# Patient Record
Sex: MALE | URBAN - METROPOLITAN AREA
[De-identification: names, ages, dates, MRNs, and addresses within clinical notes are randomized per-mention and may not be internally consistent; named-entity substitution may affect disease eponyms.]

---

## 2018-04-24 ENCOUNTER — IMPORTED ENCOUNTER (OUTPATIENT)
Dept: URBAN - METROPOLITAN AREA CLINIC 50 | Facility: CLINIC | Age: 73
End: 2018-04-24

## 2018-04-24 NOTE — PATIENT DISCUSSION
"""Start Blinking sequences both eyes . as many times a day as you can. (3-5 Reps) Especially while reading. "" ""Start Refresh both eyes twice a day. or as needed.  """

## 2018-05-08 ENCOUNTER — IMPORTED ENCOUNTER (OUTPATIENT)
Dept: URBAN - METROPOLITAN AREA CLINIC 50 | Facility: CLINIC | Age: 73
End: 2018-05-08

## 2018-05-15 ENCOUNTER — IMPORTED ENCOUNTER (OUTPATIENT)
Dept: URBAN - METROPOLITAN AREA CLINIC 50 | Facility: CLINIC | Age: 73
End: 2018-05-15

## 2018-07-11 ENCOUNTER — IMPORTED ENCOUNTER (OUTPATIENT)
Dept: URBAN - METROPOLITAN AREA CLINIC 50 | Facility: CLINIC | Age: 73
End: 2018-07-11

## 2018-07-11 NOTE — PATIENT DISCUSSION
"""Recommend Bilateral Upper Lid Blepharoplasty with excision of Lesion(s) right upper lid and left lower lid 07/19/2018 at [address] [place] [address].  Discussed with patient KRISTY

## 2018-07-23 ENCOUNTER — IMPORTED ENCOUNTER (OUTPATIENT)
Dept: URBAN - METROPOLITAN AREA CLINIC 50 | Facility: CLINIC | Age: 73
End: 2018-07-23

## 2018-07-30 ENCOUNTER — IMPORTED ENCOUNTER (OUTPATIENT)
Dept: URBAN - METROPOLITAN AREA CLINIC 50 | Facility: CLINIC | Age: 73
End: 2018-07-30

## 2021-04-18 ASSESSMENT — VISUAL ACUITY
OD_OTHER: 20/25. 20/25.
OS_BAT: 20/30
OD_SC: 20/20
OD_SC: 20/20
OS_SC: 20/25
OS_SC: 20/25+2
OS_OTHER: 20/30. 20/40.
OS_SC: 20/20
OD_SC: 20/20
OS_SC: 20/20-
OS_CC: J1+
OD_BAT: 20/25
OD_SC: 20/20
OD_CC: J1+

## 2021-04-18 ASSESSMENT — TONOMETRY
OD_IOP_MMHG: 15
OD_IOP_MMHG: 16
OS_IOP_MMHG: 16
OS_IOP_MMHG: 16
OD_IOP_MMHG: 16
OS_IOP_MMHG: 16

## 2021-05-15 ENCOUNTER — PREPPED CHART (OUTPATIENT)
Dept: URBAN - METROPOLITAN AREA CLINIC 49 | Facility: CLINIC | Age: 76
End: 2021-05-15

## 2021-05-15 ASSESSMENT — VISUAL ACUITY
OS_SC: 20/25
OD_SC: 20/20

## 2021-05-17 ENCOUNTER — COMPREHENSIVE EXAM (OUTPATIENT)
Dept: URBAN - METROPOLITAN AREA CLINIC 50 | Facility: CLINIC | Age: 76
End: 2021-05-17

## 2021-05-17 DIAGNOSIS — H16.223: ICD-10-CM

## 2021-05-17 PROCEDURE — 92014 COMPRE OPH EXAM EST PT 1/>: CPT

## 2021-05-17 ASSESSMENT — VISUAL ACUITY
OD_SC: 20/20-2
OS_SC: 20/20-1

## 2021-05-17 ASSESSMENT — TONOMETRY
OD_IOP_MMHG: 15
OS_IOP_MMHG: 15

## 2021-05-17 NOTE — PATIENT DISCUSSION
Discussion on dry eye. Explained that dry eye can cause blurred/fluctuating VA. Consider Restasis if dry eye symptoms worsen.

## 2022-09-21 ENCOUNTER — COMPREHENSIVE EXAM (OUTPATIENT)
Dept: URBAN - METROPOLITAN AREA CLINIC 53 | Facility: CLINIC | Age: 77
End: 2022-09-21

## 2022-09-21 DIAGNOSIS — H16.223: ICD-10-CM

## 2022-09-21 DIAGNOSIS — H40.013: ICD-10-CM

## 2022-09-21 PROCEDURE — 92014 COMPRE OPH EXAM EST PT 1/>: CPT

## 2022-09-21 ASSESSMENT — VISUAL ACUITY
OU_SC: 20/20-2
OS_PH: 20/25
OS_SC: 20/30
OD_SC: 20/25-1
OU_CC: J1+ @ 14IN

## 2022-09-21 ASSESSMENT — TONOMETRY
OD_IOP_MMHG: 18
OS_IOP_MMHG: 17

## 2022-09-21 NOTE — PATIENT DISCUSSION
GLAUCOMA SUSPECT-C/D: The patient is a glaucoma suspect because of suspicious appearance of the optic disc(s). OS>OD.  IOP today 18/17. RTC in 6 months for HVF/RNFL/IOP check. (-) family HX.

## 2022-09-21 NOTE — PATIENT DISCUSSION
Discussed that he can wear yellow tinted glasses for driving at night and to see if PF tears help with  glare/halos.

## 2024-01-17 ENCOUNTER — COMPREHENSIVE EXAM (OUTPATIENT)
Dept: URBAN - METROPOLITAN AREA CLINIC 53 | Facility: CLINIC | Age: 79
End: 2024-01-17

## 2024-01-17 DIAGNOSIS — H02.834: ICD-10-CM

## 2024-01-17 DIAGNOSIS — H02.831: ICD-10-CM

## 2024-01-17 DIAGNOSIS — G43.B0: ICD-10-CM

## 2024-01-17 DIAGNOSIS — H16.223: ICD-10-CM

## 2024-01-17 DIAGNOSIS — H40.013: ICD-10-CM

## 2024-01-17 PROCEDURE — 92083 EXTENDED VISUAL FIELD XM: CPT

## 2024-01-17 PROCEDURE — 92015 DETERMINE REFRACTIVE STATE: CPT

## 2024-01-17 PROCEDURE — 92133 CPTRZD OPH DX IMG PST SGM ON: CPT

## 2024-01-17 PROCEDURE — 92014 COMPRE OPH EXAM EST PT 1/>: CPT

## 2024-01-17 ASSESSMENT — VISUAL ACUITY
OS_PH: 20/20
OD_SC: 20/20
OD_GLARE: 20/20
OS_SC: 20/40
OU_CC: J1+
OD_GLARE: 20/25

## 2024-01-17 ASSESSMENT — TONOMETRY
OD_IOP_MMHG: 09
OD_IOP_MMHG: 15
OS_IOP_MMHG: 16
OS_IOP_MMHG: 12

## 2025-02-19 ENCOUNTER — COMPREHENSIVE EXAM (OUTPATIENT)
Age: 80
End: 2025-02-19

## 2025-02-19 DIAGNOSIS — H40.013: ICD-10-CM

## 2025-02-19 DIAGNOSIS — H16.223: ICD-10-CM

## 2025-02-19 DIAGNOSIS — G43.B0: ICD-10-CM

## 2025-02-19 PROCEDURE — 99213 OFFICE O/P EST LOW 20 MIN: CPT
